# Patient Record
Sex: FEMALE | Race: OTHER | NOT HISPANIC OR LATINO
[De-identification: names, ages, dates, MRNs, and addresses within clinical notes are randomized per-mention and may not be internally consistent; named-entity substitution may affect disease eponyms.]

---

## 2017-08-17 PROBLEM — Z00.00 ENCOUNTER FOR PREVENTIVE HEALTH EXAMINATION: Status: ACTIVE | Noted: 2017-08-17

## 2017-09-09 ENCOUNTER — APPOINTMENT (OUTPATIENT)
Dept: MRI IMAGING | Facility: IMAGING CENTER | Age: 22
End: 2017-09-09
Payer: MEDICAID

## 2017-09-09 ENCOUNTER — APPOINTMENT (OUTPATIENT)
Dept: ULTRASOUND IMAGING | Facility: IMAGING CENTER | Age: 22
End: 2017-09-09
Payer: MEDICAID

## 2017-09-09 ENCOUNTER — OUTPATIENT (OUTPATIENT)
Dept: OUTPATIENT SERVICES | Facility: HOSPITAL | Age: 22
LOS: 1 days | End: 2017-09-09
Payer: MEDICAID

## 2017-09-09 DIAGNOSIS — R51 HEADACHE: ICD-10-CM

## 2017-09-09 DIAGNOSIS — D72.9 DISORDER OF WHITE BLOOD CELLS, UNSPECIFIED: ICD-10-CM

## 2017-09-09 PROCEDURE — A9585: CPT

## 2017-09-09 PROCEDURE — 76700 US EXAM ABDOM COMPLETE: CPT | Mod: 26

## 2017-09-09 PROCEDURE — 70553 MRI BRAIN STEM W/O & W/DYE: CPT | Mod: 26

## 2017-09-09 PROCEDURE — 70553 MRI BRAIN STEM W/O & W/DYE: CPT

## 2017-09-09 PROCEDURE — 76700 US EXAM ABDOM COMPLETE: CPT

## 2017-09-14 DIAGNOSIS — R51 HEADACHE: ICD-10-CM

## 2017-09-14 DIAGNOSIS — K82.4 CHOLESTEROLOSIS OF GALLBLADDER: ICD-10-CM

## 2017-11-06 ENCOUNTER — APPOINTMENT (OUTPATIENT)
Dept: SURGICAL ONCOLOGY | Facility: CLINIC | Age: 22
End: 2017-11-06
Payer: MEDICAID

## 2017-11-06 VITALS
WEIGHT: 132 LBS | HEIGHT: 62 IN | HEART RATE: 74 BPM | DIASTOLIC BLOOD PRESSURE: 66 MMHG | OXYGEN SATURATION: 100 % | BODY MASS INDEX: 24.29 KG/M2 | TEMPERATURE: 97.5 F | SYSTOLIC BLOOD PRESSURE: 101 MMHG

## 2017-11-06 DIAGNOSIS — Z78.9 OTHER SPECIFIED HEALTH STATUS: ICD-10-CM

## 2017-11-06 DIAGNOSIS — Z82.49 FAMILY HISTORY OF ISCHEMIC HEART DISEASE AND OTHER DISEASES OF THE CIRCULATORY SYSTEM: ICD-10-CM

## 2017-11-06 DIAGNOSIS — Z87.39 PERSONAL HISTORY OF OTHER DISEASES OF THE MUSCULOSKELETAL SYSTEM AND CONNECTIVE TISSUE: ICD-10-CM

## 2017-11-06 DIAGNOSIS — K31.9 DISEASE OF STOMACH AND DUODENUM, UNSPECIFIED: ICD-10-CM

## 2017-11-06 PROCEDURE — 99204 OFFICE O/P NEW MOD 45 MIN: CPT

## 2017-11-06 RX ORDER — PNV NO.95/FERROUS FUM/FOLIC AC 28MG-0.8MG
TABLET ORAL DAILY
Refills: 0 | Status: ACTIVE | COMMUNITY

## 2017-12-02 ENCOUNTER — OUTPATIENT (OUTPATIENT)
Dept: OUTPATIENT SERVICES | Facility: HOSPITAL | Age: 22
LOS: 1 days | End: 2017-12-02
Payer: MEDICAID

## 2017-12-02 ENCOUNTER — APPOINTMENT (OUTPATIENT)
Dept: MRI IMAGING | Facility: IMAGING CENTER | Age: 22
End: 2017-12-02
Payer: MEDICAID

## 2017-12-02 DIAGNOSIS — Z00.8 ENCOUNTER FOR OTHER GENERAL EXAMINATION: ICD-10-CM

## 2017-12-02 PROCEDURE — 70551 MRI BRAIN STEM W/O DYE: CPT | Mod: 26

## 2017-12-02 PROCEDURE — 70551 MRI BRAIN STEM W/O DYE: CPT

## 2018-05-02 ENCOUNTER — OUTPATIENT (OUTPATIENT)
Dept: OUTPATIENT SERVICES | Facility: HOSPITAL | Age: 23
LOS: 1 days | End: 2018-05-02
Payer: COMMERCIAL

## 2018-05-02 ENCOUNTER — APPOINTMENT (OUTPATIENT)
Dept: ULTRASOUND IMAGING | Facility: IMAGING CENTER | Age: 23
End: 2018-05-02
Payer: MEDICAID

## 2018-05-02 DIAGNOSIS — Z00.8 ENCOUNTER FOR OTHER GENERAL EXAMINATION: ICD-10-CM

## 2018-05-02 PROCEDURE — 76700 US EXAM ABDOM COMPLETE: CPT | Mod: 26

## 2018-05-02 PROCEDURE — 76700 US EXAM ABDOM COMPLETE: CPT

## 2018-05-07 ENCOUNTER — APPOINTMENT (OUTPATIENT)
Dept: SURGICAL ONCOLOGY | Facility: CLINIC | Age: 23
End: 2018-05-07
Payer: MEDICAID

## 2018-05-07 VITALS
RESPIRATION RATE: 13 BRPM | HEIGHT: 62 IN | HEART RATE: 80 BPM | DIASTOLIC BLOOD PRESSURE: 73 MMHG | WEIGHT: 133 LBS | OXYGEN SATURATION: 100 % | BODY MASS INDEX: 24.48 KG/M2 | SYSTOLIC BLOOD PRESSURE: 119 MMHG

## 2018-05-07 DIAGNOSIS — K82.4 CHOLESTEROLOSIS OF GALLBLADDER: ICD-10-CM

## 2018-05-07 PROCEDURE — 99214 OFFICE O/P EST MOD 30 MIN: CPT

## 2019-04-06 ENCOUNTER — EMERGENCY (EMERGENCY)
Facility: HOSPITAL | Age: 24
LOS: 1 days | Discharge: HOME | End: 2019-04-06
Attending: EMERGENCY MEDICINE
Payer: MEDICAID

## 2019-04-06 VITALS
TEMPERATURE: 98 F | RESPIRATION RATE: 20 BRPM | DIASTOLIC BLOOD PRESSURE: 88 MMHG | SYSTOLIC BLOOD PRESSURE: 137 MMHG | HEART RATE: 82 BPM | WEIGHT: 149.91 LBS | OXYGEN SATURATION: 100 % | HEIGHT: 62 IN

## 2019-04-06 PROCEDURE — 99284 EMERGENCY DEPT VISIT MOD MDM: CPT | Mod: 25

## 2019-04-06 PROCEDURE — 73110 X-RAY EXAM OF WRIST: CPT | Mod: 26,RT

## 2019-04-06 PROCEDURE — 73080 X-RAY EXAM OF ELBOW: CPT

## 2019-04-06 PROCEDURE — 73110 X-RAY EXAM OF WRIST: CPT

## 2019-04-06 PROCEDURE — 99283 EMERGENCY DEPT VISIT LOW MDM: CPT

## 2019-04-06 PROCEDURE — 73080 X-RAY EXAM OF ELBOW: CPT | Mod: 26,RT

## 2019-04-06 RX ORDER — IBUPROFEN 200 MG
1 TABLET ORAL
Qty: 21 | Refills: 0 | OUTPATIENT
Start: 2019-04-06 | End: 2019-04-12

## 2019-04-06 NOTE — ED ADULT NURSE NOTE - NSIMPLEMENTINTERV_GEN_ALL_ED
Implemented All Universal Safety Interventions:  [place] to call system. Call bell, personal items and telephone within reach. Instruct patient to call for assistance. Room bathroom lighting operational. Non-slip footwear when patient is off stretcher. Physically safe environment: no spills, clutter or unnecessary equipment. Stretcher in lowest position, wheels locked, appropriate side rails in place.

## 2019-04-06 NOTE — ED ADULT TRIAGE NOTE - CHIEF COMPLAINT QUOTE
Injury sustained s/p mechanical fall from standing height.  Postive fracture as per urgent care/pcp Injury sustained s/p mechanical fall from standing height.  Positive fracture as per urgent care/pcp

## 2019-04-06 NOTE — ED PROVIDER NOTE - UPPER EXTREMITY EXAM, RIGHT
right elbow: pain/slight swelling diffuse. right wrist-mild tenderness/no swelling, full ROM, no snuffbox tenderness, fingers normal

## 2019-04-06 NOTE — ED PROVIDER NOTE - PHYSICAL EXAMINATION
good rom of elbow. but has some pain. no clinical evidence of acute fx.  good supination and pronation  no tend in snuffbox.

## 2019-04-06 NOTE — ED PROVIDER NOTE - OBJECTIVE STATEMENT
24 y/o F with no PMHx/PSHx presents to ED c/o 2 days ago was lifted by boyfriend, fell backwards and landed on outstretched right hand, resulting in pain to right elbow/wrist. Pt wrapped areas and took pain killers but did not help with pain. Saw doctor today who did an x-ray and was concerned for possible elbow fracture (pt does not know details). Was sent to ED for evaluation. Denies pregnancy. NKDA.

## 2019-04-06 NOTE — ED ADULT NURSE NOTE - CHIEF COMPLAINT QUOTE
Injury sustained s/p mechanical fall from standing height.  Positive fracture as per urgent care/pcp

## 2019-04-09 PROBLEM — Z78.9 OTHER SPECIFIED HEALTH STATUS: Chronic | Status: ACTIVE | Noted: 2019-04-06

## 2019-04-19 ENCOUNTER — APPOINTMENT (OUTPATIENT)
Dept: ORTHOPEDIC SURGERY | Facility: CLINIC | Age: 24
End: 2019-04-19
Payer: MEDICAID

## 2019-04-19 VITALS
DIASTOLIC BLOOD PRESSURE: 87 MMHG | WEIGHT: 150 LBS | HEIGHT: 62 IN | BODY MASS INDEX: 27.6 KG/M2 | HEART RATE: 89 BPM | SYSTOLIC BLOOD PRESSURE: 124 MMHG

## 2019-04-19 DIAGNOSIS — Z86.39 PERSONAL HISTORY OF OTHER ENDOCRINE, NUTRITIONAL AND METABOLIC DISEASE: ICD-10-CM

## 2019-04-19 DIAGNOSIS — Z78.9 OTHER SPECIFIED HEALTH STATUS: ICD-10-CM

## 2019-04-19 DIAGNOSIS — Z86.018 PERSONAL HISTORY OF OTHER BENIGN NEOPLASM: ICD-10-CM

## 2019-04-19 DIAGNOSIS — Z87.39 PERSONAL HISTORY OF OTHER DISEASES OF THE MUSCULOSKELETAL SYSTEM AND CONNECTIVE TISSUE: ICD-10-CM

## 2019-04-19 PROCEDURE — 99204 OFFICE O/P NEW MOD 45 MIN: CPT

## 2019-04-19 PROCEDURE — 73080 X-RAY EXAM OF ELBOW: CPT | Mod: RT

## 2019-04-19 RX ORDER — SULFASALAZINE 500 MG/1
500 TABLET ORAL
Refills: 0 | Status: ACTIVE | COMMUNITY

## 2019-04-19 NOTE — DISCUSSION/SUMMARY
[de-identified] : Right elbow radial head versus radial neck nondisplaced fractures\par \par \par Discussed my findings and history exam and radiology recommend nonoperative management including\par \par Physical therapy began in 1-2 weeks when the pain improves\par \par The patient was prescribed Diclofenac PO non-steroidal anti-inflammatory medication. 50mg tablets twice daily to be taken for at least 1-2 weeks in a row and then PRN afterwards. Risks and benefits were discussed and include but not limited to renal damage and GI ulceration and bleeding.  They were advised to take with food to limit stomach upset as well as warned to stop the medication if worsening gastric pain or dizziness or other side effects. Also to immediately stop the medication and seek appropriate medical attention if any severe stomach ache, gastritis, black/red vomit, black/red stools or any other medical concern.\par \par Limited weightbearing right upper extremity to less than 5 pounds\par \par Will reexamine the patient after improvement in pain for ligamentous\par \par Followup in 6-8 weeks after completion of physical therapy

## 2019-04-19 NOTE — PHYSICAL EXAM
[de-identified] : Physical Examination\par General: well nourished, in no acute distress, alert and oriented to person, place and time\par Psychiatric: normal mood and affect, no abnormal movements or speech patterns\par Eyes: vision intact - glasses\par Throat: no thyromegaly\par Lymph: no enlarged nodes, no lymphedema in extremity\par Respiratory: no wheezing, no shortness of breath with ambulation\par Cardiac: no cardiac leg swelling, 2+ peripheral pulses\par Neurology: normal gross sensation in extremities to light touch\par Abdomen: soft, non-tender, tympanic, no masses\par \par Musculoskeletal Examination\par Cervical spine		Full painless range of motion and negative Spurling's test\par \par Elbow     			Right			Left\par Appearance\par      Skin 				normal			normal\par      Swelling/Deformity		normal			normal\par  Range of Motion\par      Flexion			140			160\par      Extension			20			0\par      Supination			65			85\par      Pronation			90			90\par Palpation\par      Radial Head			+			-\par      Lateral Epicondyle		-			-\par      Medial Epicondyle		-			-\par      Olecranon			-			-\par      Triceps Tendon		-			-\par      Biceps Tendon		-			-\par \par Strength Examination\par      Flexion 			5+ / 0&			5+ / 0\par      Extension			5+ / 0&			5+ / 0\par      Supination			5+ / 0&			5+ / 0\par      Pronation			5+ / 0&			5+ / 0\par      				normal&			normal\par \par Special Examination\par      Milking Posterolateral		-&			-\par      Chair Rise Posteromedial 	-&			-\par      Ulnar Cubital Tunnel Tinnels	-&			-\par      Sublux Ulnar Nerve		-&			-\par      \par Sensation\par      Axillary			normal			normal\par      LatAntCubBrach 		normal			normal\par      Median 			normal			normal\par      Ulnar 			normal			normal\par      Radial 			normal			normal\par Motor\par      AIN 				normal			normal\par      Ulnar 			normal			normal\par      Radial 			normal			normal\par      PIN 				normal			normal\par Pulses\par      Radial			2+			2+\par  [de-identified] : 3 views of the left elbow (AP, lateral and radial head)\par were ordered, obtained and evaluated by myself today and\par demonstrate:\par The radial head aligns with the capitellum on all views\par no degenerative joint disease\par Questionable hairline radial neck versus radial head fracture\par no dislocation \par Otherwise normal osseous bone structure and soft tissue contour\par

## 2019-04-19 NOTE — HISTORY OF PRESENT ILLNESS
[de-identified] : CC right Elbow\par \par HPI 22 yo female right HD presents with acute onset of 2 weeks of constant pain in the lateral right elbow after a fall. The pain is same, and rated a 8 out of 10, described as sure, with radiation up the arm and down the forearm. Painkillers makes the pain better and moving the hand makes the pain worse. The patient reports associated symptoms of weakness. The patient - pain at night affecting sleep, - neck pain, and - similar pain previously.\par \par The patient has tried the following treatments:\par Activity modification	+\par Ice			+\par Braces    		-\par Nsaids    		+\par Physical Therapy  	-\par Cortisone Injection	-\par Arthroscopy/Surgery	-\par \par Review of Systems is positive for the above musculoskeletal symptoms and is otherwise non-contributory for general, constitutional, psychiatric, neurologic, HEENT, cardiac, respiratory, gastrointestinal, reproductive, lymphatic, and dermatologic complaints.\par \par Consult by Dr Joanna Jones

## 2019-04-20 ENCOUNTER — TRANSCRIPTION ENCOUNTER (OUTPATIENT)
Age: 24
End: 2019-04-20

## 2019-05-08 ENCOUNTER — APPOINTMENT (OUTPATIENT)
Dept: UROLOGY | Facility: CLINIC | Age: 24
End: 2019-05-08
Payer: MEDICAID

## 2019-05-08 VITALS
WEIGHT: 145 LBS | HEIGHT: 62 IN | DIASTOLIC BLOOD PRESSURE: 88 MMHG | SYSTOLIC BLOOD PRESSURE: 129 MMHG | BODY MASS INDEX: 26.68 KG/M2 | HEART RATE: 117 BPM

## 2019-05-08 PROCEDURE — 51701 INSERT BLADDER CATHETER: CPT

## 2019-05-08 PROCEDURE — 99204 OFFICE O/P NEW MOD 45 MIN: CPT | Mod: 25

## 2019-05-10 ENCOUNTER — RESULT REVIEW (OUTPATIENT)
Age: 24
End: 2019-05-10

## 2019-05-10 LAB
APPEARANCE: CLEAR
BACTERIA UR CULT: NORMAL
BACTERIA: NEGATIVE
BILIRUBIN URINE: NEGATIVE
BLOOD URINE: NEGATIVE
COLOR: COLORLESS
GLUCOSE QUALITATIVE U: NEGATIVE
HYALINE CASTS: 0 /LPF
KETONES URINE: NEGATIVE
LEUKOCYTE ESTERASE URINE: NEGATIVE
MICROSCOPIC-UA: NORMAL
NITRITE URINE: NEGATIVE
PH URINE: 7
PROTEIN URINE: NEGATIVE
RED BLOOD CELLS URINE: 6 /HPF
SPECIFIC GRAVITY URINE: 1.01
SQUAMOUS EPITHELIAL CELLS: 0 /HPF
UROBILINOGEN URINE: NORMAL
WHITE BLOOD CELLS URINE: 0 /HPF

## 2019-05-10 NOTE — HISTORY OF PRESENT ILLNESS
[FreeTextEntry1] : cc abnormal urine \par 22 yo fem referred for eval abnormal urine \par no fever \par no dysuria \par voiding well\par ua numerous rbc, wbc, bacteria , ucx mixed bact \par no fam h/o gu cancer \par abd sono \par nl kidneys

## 2019-05-10 NOTE — PHYSICAL EXAM
[General Appearance - Well Developed] : well developed [Normal Appearance] : normal appearance [General Appearance - Well Nourished] : well nourished [General Appearance - In No Acute Distress] : no acute distress [Well Groomed] : well groomed [Edema] : no peripheral edema [Respiration, Rhythm And Depth] : normal respiratory rhythm and effort [Abdomen Tenderness] : non-tender [Exaggerated Use Of Accessory Muscles For Inspiration] : no accessory muscle use [Abdomen Soft] : soft [Costovertebral Angle Tenderness] : no ~M costovertebral angle tenderness [Urinary Bladder Findings] : the bladder was normal on palpation [Normal Station and Gait] : the gait and station were normal for the patient's age [] : no rash [Oriented To Time, Place, And Person] : oriented to person, place, and time [No Focal Deficits] : no focal deficits [Mood] : the mood was normal [Affect] : the affect was normal [Not Anxious] : not anxious [No Palpable Adenopathy] : no palpable adenopathy

## 2019-05-31 ENCOUNTER — APPOINTMENT (OUTPATIENT)
Dept: ORTHOPEDIC SURGERY | Facility: CLINIC | Age: 24
End: 2019-05-31
Payer: MEDICAID

## 2019-05-31 PROCEDURE — 99213 OFFICE O/P EST LOW 20 MIN: CPT

## 2019-05-31 PROCEDURE — 73080 X-RAY EXAM OF ELBOW: CPT | Mod: LT

## 2019-05-31 NOTE — HISTORY OF PRESENT ILLNESS
[de-identified] : CC right Elbow\par \par HPI 22 yo female right HD presents for 4 week PT FU of acute onset of 2 weeks of constant pain in the lateral right elbow after a fall. The pain is same, and rated a 8 out of 10, described as sure, with radiation up the arm and down the forearm. Painkillers makes the pain better and moving the hand makes the pain worse. The patient reports associated symptoms of weakness. The patient - pain at night affecting sleep, - neck pain, and - similar pain previously.\par \par The patient has tried the following treatments:\par Activity modification	+\par Ice			+\par Braces    		-\par Nsaids    		+\par Physical Therapy  	+\par Cortisone Injection	-\par Arthroscopy/Surgery	-\par \par pain improving significantly, ROM better, still has nagging pain lateral and posterior elbow\par \par Review of Systems is positive for the above musculoskeletal symptoms and is otherwise non-contributory for general, constitutional, psychiatric, neurologic, HEENT, cardiac, respiratory, gastrointestinal, reproductive, lymphatic, and dermatologic complaints.\par \par Consult by Dr Joanna Jones

## 2019-05-31 NOTE — DISCUSSION/SUMMARY
[de-identified] : Right elbow radial head versus radial neck nondisplaced fractures\par \par \par Discussed my findings and history exam and radiology recommend nonoperative management including\par \par discussed improving fx pain but possible tendon/muscle strain pain remaining. no gross instability on exam\par \par Physical therapy finish\par \par Will reexamine the patient after completion of physical therapy for possible advanced imaging

## 2019-06-13 ENCOUNTER — APPOINTMENT (OUTPATIENT)
Dept: UROLOGY | Facility: CLINIC | Age: 24
End: 2019-06-13
Payer: MEDICAID

## 2019-06-13 DIAGNOSIS — R30.0 DYSURIA: ICD-10-CM

## 2019-06-13 PROCEDURE — 52000 CYSTOURETHROSCOPY: CPT

## 2019-06-21 ENCOUNTER — APPOINTMENT (OUTPATIENT)
Dept: ORTHOPEDIC SURGERY | Facility: CLINIC | Age: 24
End: 2019-06-21
Payer: MEDICAID

## 2019-06-21 PROCEDURE — 73080 X-RAY EXAM OF ELBOW: CPT | Mod: RT

## 2019-06-21 PROCEDURE — 99214 OFFICE O/P EST MOD 30 MIN: CPT

## 2019-06-21 NOTE — HISTORY OF PRESENT ILLNESS
[de-identified] : CC right Elbow\par \par HPI 22 yo female right HD presents for 2 month FU of acute onset of 2 weeks of constant pain in the lateral right elbow after a fall. The pain is same, and rated a 8 out of 10, described as sure, with radiation up the arm and down the forearm. Painkillers makes the pain better and moving the hand makes the pain worse. The patient reports associated symptoms of weakness. The patient - pain at night affecting sleep, - neck pain, and - similar pain previously.\par \par The patient has tried the following treatments:\par Activity modification	+\par Ice			+\par Braces    		-\par Nsaids    		+\par Physical Therapy  	+\par Cortisone Injection	-\par Arthroscopy/Surgery	-\par \par continues to have pain laterally over radial head and pronator\par \par Review of Systems is positive for the above musculoskeletal symptoms and is otherwise non-contributory for general, constitutional, psychiatric, neurologic, HEENT, cardiac, respiratory, gastrointestinal, reproductive, lymphatic, and dermatologic complaints.\par \par Consult by Dr Joanna Jones

## 2019-06-21 NOTE — DISCUSSION/SUMMARY
[de-identified] : Right elbow radial head versus radial neck nondisplaced fractures\par \par \par Discussed my findings and history exam and radiology recommend nonoperative management including\par \par discussed improving fx pain but possible tendon/muscle strain pain remaining. no gross instability on exam\par \par given continued pain will order mri despite 6 weeks PT, nsaids activity modification\par \par FU after mri

## 2019-06-21 NOTE — PHYSICAL EXAM
[de-identified] : Physical Examination\par General: well nourished, in no acute distress, alert and oriented to person, place and time\par Psychiatric: normal mood and affect, no abnormal movements or speech patterns\par Eyes: vision intact - glasses\par Throat: no thyromegaly\par Lymph: no enlarged nodes, no lymphedema in extremity\par Respiratory: no wheezing, no shortness of breath with ambulation\par Cardiac: no cardiac leg swelling, 2+ peripheral pulses\par Neurology: normal gross sensation in extremities to light touch\par Abdomen: soft, non-tender, tympanic, no masses\par \par Musculoskeletal Examination\par Cervical spine		Full painless range of motion and negative Spurling's test\par \par Elbow     			Right			Left\par Appearance\par      Skin 				normal			normal\par      Swelling/Deformity		normal			normal\par  Range of Motion\par      Flexion			160			160\par      Extension			0			0\par      Supination			85			85\par      Pronation			90			90\par Palpation\par      Radial Head			+			-\par      Lateral Epicondyle		trace			-\par      Medial Epicondyle		-			-\par      Olecranon			-			-\par      Triceps Tendon		-			-\par      Biceps Tendon		-			-\par \par Strength Examination\par      Flexion 			5+ / +			5+ / 0\par      Extension			5+ / +			5+ / 0\par      Supination			5+ / +			5+ / 0\par      Pronation			5+ / +			5+ / 0\par      				normal			normal\par \par Special Examination\par      Milking Posterolateral		-			-\par      Chair Rise Posteromedial 	-			-\par      Ulnar Cubital Tunnel Tinnels	-			-\par      Sublux Ulnar Nerve		-			-\par      \par Sensation\par      Axillary			normal			normal\par      LatAntCubBrach 		normal			normal\par      Median 			normal			normal\par      Ulnar 			normal			normal\par      Radial 			normal			normal\par Motor\par      AIN 				normal			normal\par      Ulnar 			normal			normal\par      Radial 			normal			normal\par      PIN 				normal			normal\par Pulses\par      Radial			2+			2+\par  [de-identified] : 3 views of the left elbow (AP, lateral and radial head)\par 4-19-19\par demonstrate:\par The radial head aligns with the capitellum on all views\par no degenerative joint disease\par Questionable hairline radial neck versus radial head fracture\par no dislocation \par Otherwise normal osseous bone structure and soft tissue contour\par \par \par 3 views of the left elbow (AP, lateral and radial head)\par demonstrate:\par The radial head aligns with the capitellum on all views\par no degenerative joint disease\par Questionable hairline radial neck versus radial head fracture likely consolidated as less visible\par no dislocation \par Otherwise normal osseous bone structure and soft tissue contour\par \par \par 3 views of the left elbow (AP, lateral and radial head)\par were ordered, obtained and evaluated by myself today and\par demonstrate:\par The radial head aligns with the capitellum on all views\par no degenerative joint disease\par Questionable hairline radial neck versus radial head fracture likely consolidated as not visible\par no dislocation \par Otherwise normal osseous bone structure and soft tissue contour

## 2019-07-23 PROBLEM — R30.0 DYSURIA: Status: ACTIVE | Noted: 2019-05-08

## 2019-08-05 ENCOUNTER — APPOINTMENT (OUTPATIENT)
Dept: ORTHOPEDIC SURGERY | Facility: CLINIC | Age: 24
End: 2019-08-05
Payer: MEDICAID

## 2019-08-05 DIAGNOSIS — M25.321 OTHER INSTABILITY, RIGHT ELBOW: ICD-10-CM

## 2019-08-05 DIAGNOSIS — S52.134A NONDISPLACED FRACTURE OF NECK OF RIGHT RADIUS, INITIAL ENCOUNTER FOR CLOSED FRACTURE: ICD-10-CM

## 2019-08-05 DIAGNOSIS — M77.11 LATERAL EPICONDYLITIS, RIGHT ELBOW: ICD-10-CM

## 2019-08-05 PROCEDURE — 99214 OFFICE O/P EST MOD 30 MIN: CPT

## 2019-08-05 NOTE — PHYSICAL EXAM
[de-identified] : Physical Examination\par General: well nourished, in no acute distress, alert and oriented to person, place and time\par Psychiatric: normal mood and affect, no abnormal movements or speech patterns\par Eyes: vision intact - glasses\par Throat: no thyromegaly\par Lymph: no enlarged nodes, no lymphedema in extremity\par Respiratory: no wheezing, no shortness of breath with ambulation\par Cardiac: no cardiac leg swelling, 2+ peripheral pulses\par Neurology: normal gross sensation in extremities to light touch\par Abdomen: soft, non-tender, tympanic, no masses\par \par Musculoskeletal Examination\par Cervical spine		Full painless range of motion and negative Spurling's test\par \par Elbow     			Right			Left\par Appearance\par      Skin 				normal			normal\par      Swelling/Deformity		normal			normal\par  Range of Motion\par      Flexion			160			160\par      Extension			0			0\par      Supination			85			85\par      Pronation			90			90\par Palpation\par      Radial Head			+			-\par      Lateral Epicondyle		trace			-\par      Medial Epicondyle		-			-\par      Olecranon			-			-\par      Triceps Tendon		-			-\par      Biceps Tendon		-			-\par \par Strength Examination\par      Flexion 			5+ / +			5+ / 0\par      Extension			5+ / +			5+ / 0\par      Supination			5+ / +			5+ / 0\par      Pronation			5+ / +			5+ / 0\par      				mild pain			normal\par \par Special Examination\par      Milking Posterolateral		-			-\par      Chair Rise Posteromedial 	-			-\par      Ulnar Cubital Tunnel Tinnels	-			-\par      Sublux Ulnar Nerve		-			-\par      \par Sensation\par      Axillary			normal			normal\par      LatAntCubBrach 		normal			normal\par      Median 			normal			normal\par      Ulnar 			normal			normal\par      Radial 			normal			normal\par Motor\par      AIN 				normal			normal\par      Ulnar 			normal			normal\par      Radial 			normal			normal\par      PIN 				normal			normal\par Pulses\par      Radial			2+			2+\par  [de-identified] : 3 views of the left elbow (AP, lateral and radial head)\par 4-19-19\par demonstrate:\par The radial head aligns with the capitellum on all views\par no degenerative joint disease\par Questionable hairline radial neck versus radial head fracture\par no dislocation \par Otherwise normal osseous bone structure and soft tissue contour\par \par \par 3 views of the left elbow (AP, lateral and radial head)\par demonstrate:\par The radial head aligns with the capitellum on all views\par no degenerative joint disease\par Questionable hairline radial neck versus radial head fracture likely consolidated as less visible\par no dislocation \par Otherwise normal osseous bone structure and soft tissue contour\par \par \par 3 views of the left elbow (AP, lateral and radial head)\par 6-21-19\par demonstrate:\par The radial head aligns with the capitellum on all views\par no degenerative joint disease\par Questionable hairline radial neck versus radial head fracture likely consolidated as not visible\par no dislocation \par Otherwise normal osseous bone structure and soft tissue contour\par \par MRI dated 7-13-19 from [facility]\par \par Quality of the MRI is good\par There is a linear stripe of bone marrow edema at the radial head consistent with a radial head fracture that is nondisplaced\par Visual evidence of ligamentous damage or edema within the soft tissues\par There's no subluxation or dislocation\par \par Formal impression:\par Common extensor tendon is intact at the lateral epicondyle. Underlying radial collateral ligament and lateral ulnar collateral ligament/annular ligament are intact. Common flexor tendon is intact at the medial epicondyle. Underlying medial collateral ligament is intact.\par Triceps tendon reaches the olecranon process. Distal biceps tendon reaches the radial tubercle. Brachialis tendon reaches the proximal ulna.\par Ulnar nerve and remaining nerves and vessels are unremarkable. Small amount of joint fluid is present. Joint is otherwise maintained. Bones and muscles are unremarkable.\par IMPRESSION: Unremarkable exam\par

## 2019-08-05 NOTE — HISTORY OF PRESENT ILLNESS
[de-identified] : CC right Elbow\par \par HPI 22 yo female right HD presents for mri FU of acute onset of 2 weeks of constant pain in the lateral right elbow after a fall. The pain is same, and rated a 8 out of 10, described as sure, with radiation up the arm and down the forearm. Painkillers makes the pain better and moving the hand makes the pain worse. The patient reports associated symptoms of weakness. The patient - pain at night affecting sleep, - neck pain, and - similar pain previously.\par \par The patient has tried the following treatments:\par Activity modification	+\par Ice			+\par Braces    		-\par Nsaids    		+\par Physical Therapy  	+\par Cortisone Injection	-\par Arthroscopy/Surgery	-\par \par continues to have pain laterally over radial head and pronator\par \par Review of Systems is positive for the above musculoskeletal symptoms and is otherwise non-contributory for general, constitutional, psychiatric, neurologic, HEENT, cardiac, respiratory, gastrointestinal, reproductive, lymphatic, and dermatologic complaints.\par \par Consult by Dr Joanna Jones

## 2019-08-05 NOTE — DISCUSSION/SUMMARY
[de-identified] : Right elbow radial head versus radial neck nondisplaced fractures\par right elbow lateral epicondylitis\par \par \par Discussed my findings and history exam and radiology recommend nonoperative management including\par \par discussed improving fx pain but possible tendon/muscle strain pain remaining. mri inconclusive\par \par The patient's history, exam and radiology was discussed and reviewed with the patient. The relevent anatomic pathology was reviewed with images, diagrams and models.\par Tennis elbow or lateral epicondylitis is the gradual degeneration from wear and tear of the wrist extensor tendon attachment at the elbow. This weakened tendon can cause pain at the elbow, painful  and weak  strength. The problem is difficult to treat and often takes many months to improve, and can re-occur after improving symptoms. There are several non-operative management techniques including patient education, activity modification, topical and oral non-steroidal anti-inflammatories, night-time wrist splint, elbow counterforce brace, local anesthesia with needling and deep tissue massage with or without corticosteroid injection, and physical therapy. If all these problems fail, excising the degenerative tendon portion and repairing the tendon improves this problem through a small incision over the tendon or arthroscopically from inside the elbow. Benefits and risks of the treatments were discussed with the patient.\par \par \par Nighttime wrist brace\par \par elbow extension night brace\par \par The patient was prescribed Diclofenac PO non-steroidal anti-inflammatory medication. 50mg tablets twice daily to be taken for at least 1-2 weeks in a row and then PRN afterwards. Risks and benefits were discussed and include but not limited to renal damage and GI ulceration and bleeding.  They were advised to take with food to limit stomach upset as well as warned to stop the medication if worsening gastric pain or dizziness or other side effects. Also to immediately stop the medication and seek appropriate medical attention if any severe stomach ache, gastritis, black/red vomit, black/red stools or any other medical concern.\par \par \par The patient verifies their understanding the the visit, diagnosis and plan. They agree with the treatment plan and will contact the office with any questions or problems.\par \par Follow up\par PRN

## 2021-02-11 ENCOUNTER — APPOINTMENT (OUTPATIENT)
Dept: UROLOGY | Facility: CLINIC | Age: 26
End: 2021-02-11
Payer: MEDICAID

## 2021-02-11 VITALS
WEIGHT: 145 LBS | SYSTOLIC BLOOD PRESSURE: 122 MMHG | BODY MASS INDEX: 26.68 KG/M2 | TEMPERATURE: 98.4 F | DIASTOLIC BLOOD PRESSURE: 75 MMHG | HEIGHT: 62 IN | HEART RATE: 102 BPM

## 2021-02-11 DIAGNOSIS — R82.90 UNSPECIFIED ABNORMAL FINDINGS IN URINE: ICD-10-CM

## 2021-02-11 PROCEDURE — 99072 ADDL SUPL MATRL&STAF TM PHE: CPT

## 2021-02-11 PROCEDURE — 99213 OFFICE O/P EST LOW 20 MIN: CPT

## 2021-02-11 RX ORDER — DICLOFENAC SODIUM 50 MG/1
50 TABLET, DELAYED RELEASE ORAL
Qty: 30 | Refills: 2 | Status: COMPLETED | COMMUNITY
Start: 2019-08-05 | End: 2021-02-11

## 2021-02-11 RX ORDER — DICLOFENAC SODIUM 50 MG/1
50 TABLET, DELAYED RELEASE ORAL
Qty: 60 | Refills: 2 | Status: COMPLETED | COMMUNITY
Start: 2019-04-19 | End: 2021-02-11

## 2021-02-15 PROBLEM — R82.90 ABNORMAL URINALYSIS: Status: ACTIVE | Noted: 2019-05-10

## 2021-02-15 NOTE — PHYSICAL EXAM
[General Appearance - Well Developed] : well developed [General Appearance - Well Nourished] : well nourished [Normal Appearance] : normal appearance [Well Groomed] : well groomed [General Appearance - In No Acute Distress] : no acute distress [Edema] : no peripheral edema [Exaggerated Use Of Accessory Muscles For Inspiration] : no accessory muscle use [Respiration, Rhythm And Depth] : normal respiratory rhythm and effort [Abdomen Soft] : soft [Abdomen Tenderness] : non-tender [Costovertebral Angle Tenderness] : no ~M costovertebral angle tenderness [Urinary Bladder Findings] : the bladder was normal on palpation [Normal Station and Gait] : the gait and station were normal for the patient's age [] : no rash [Oriented To Time, Place, And Person] : oriented to person, place, and time [No Focal Deficits] : no focal deficits [Affect] : the affect was normal [Mood] : the mood was normal [Not Anxious] : not anxious [No Palpable Adenopathy] : no palpable adenopathy

## 2021-02-15 NOTE — ASSESSMENT
[FreeTextEntry1] : 26 yo F with microhematuria\par \par - Reviewed previous workup including normal cysto in June 2019 and normal kidneys on Abd MRI on Jan 2020\par - Discussed possible etiologies for microhematuria including benign (UTI, nephrolithiasis, cyst, BPH) vs malignancy (renal, ureteral and bladder). Discussed how given pt has had full negative  workup, no further studies indicated at this time. \par - Discussed importance of follow-up should she develop any gross hematuria\par - Pt to discuss gynecology workup and nephrology workup to address microhematuria further if indicated.

## 2021-02-15 NOTE — HISTORY OF PRESENT ILLNESS
[FreeTextEntry1] : 24 yo F referred for microhematuria\par Drinks 2 bottles of water, 2 cups of kitty black tea daily\par no UTI , no gross hematuria\par no dysuria\par no history of stones\par no children\par not sexually active\par cysto = June 2019\par MRI Abd = Jan 2020 normal kidneys\par LMP = 1/16

## 2021-02-16 LAB
APPEARANCE: CLEAR
BACTERIA UR CULT: NORMAL
BACTERIA: NEGATIVE
BILIRUBIN URINE: NEGATIVE
BLOOD URINE: NEGATIVE
COLOR: YELLOW
GLUCOSE QUALITATIVE U: NEGATIVE
HYALINE CASTS: 0 /LPF
KETONES URINE: NEGATIVE
LEUKOCYTE ESTERASE URINE: NEGATIVE
MICROSCOPIC-UA: NORMAL
NITRITE URINE: NEGATIVE
PH URINE: 6.5
PROTEIN URINE: ABNORMAL
RED BLOOD CELLS URINE: 3 /HPF
SPECIFIC GRAVITY URINE: 1.03
SQUAMOUS EPITHELIAL CELLS: 4 /HPF
UROBILINOGEN URINE: ABNORMAL
WHITE BLOOD CELLS URINE: 2 /HPF
